# Patient Record
Sex: FEMALE | Race: WHITE | NOT HISPANIC OR LATINO | Employment: OTHER | URBAN - METROPOLITAN AREA
[De-identification: names, ages, dates, MRNs, and addresses within clinical notes are randomized per-mention and may not be internally consistent; named-entity substitution may affect disease eponyms.]

---

## 2020-07-17 ENCOUNTER — TELEPHONE (OUTPATIENT)
Dept: OBSTETRICS AND GYNECOLOGY | Facility: CLINIC | Age: 65
End: 2020-07-17

## 2020-07-17 NOTE — TELEPHONE ENCOUNTER
Attempted to contact patient, no answer. Left patient voice mail to return call to clinic regarding her appt on 7/23/20. Patient can come in earlier that day or reschedule for another day.

## 2020-10-14 ENCOUNTER — TELEPHONE (OUTPATIENT)
Dept: OBSTETRICS AND GYNECOLOGY | Facility: CLINIC | Age: 65
End: 2020-10-14

## 2020-10-14 DIAGNOSIS — B37.2 YEAST DERMATITIS: Primary | ICD-10-CM

## 2020-10-14 RX ORDER — FLUCONAZOLE 200 MG/1
200 TABLET ORAL DAILY
Qty: 3 TABLET | Refills: 0 | Status: SHIPPED | OUTPATIENT
Start: 2020-10-14 | End: 2020-10-17

## 2020-10-14 NOTE — TELEPHONE ENCOUNTER
----- Message from Alecia Krause sent at 10/14/2020  8:18 AM CDT -----  Type:  Needs Medical Advice    Who Called: pt   Symptoms (please be specific): itching    How long has patient had these symptoms:  1week  Pharmacy name and phone #:    Would the patient rather a call back or a response via [facility]? Callback   Best Call Back Number: [phone]   Additional Information

## 2020-10-14 NOTE — TELEPHONE ENCOUNTER
Spoke with patient. Two pt identifiers confirmed.pt requesting a rx for vaginal itching, outside. Used OTC meds that helped but it returned PCN allergy and uses [facility] on [address] fwd to Dr. Burnette for review. Check with pharmacy this afternoon . Patient verbalized understanding.

## 2020-10-22 DIAGNOSIS — B37.2 YEAST DERMATITIS: Primary | ICD-10-CM

## 2020-10-22 RX ORDER — FLUCONAZOLE 200 MG/1
200 TABLET ORAL DAILY
Qty: 3 TABLET | Refills: 0 | Status: SHIPPED | OUTPATIENT
Start: 2020-10-22 | End: 2020-10-25

## 2020-11-09 RX ORDER — NYSTATIN 100000 U/G
CREAM TOPICAL 2 TIMES DAILY
COMMUNITY
Start: 2020-10-17 | End: 2020-11-12

## 2020-11-12 ENCOUNTER — OFFICE VISIT (OUTPATIENT)
Dept: OBSTETRICS AND GYNECOLOGY | Facility: CLINIC | Age: 65
End: 2020-11-12
Payer: MEDICARE

## 2020-11-12 VITALS
WEIGHT: 139.38 LBS | DIASTOLIC BLOOD PRESSURE: 73 MMHG | SYSTOLIC BLOOD PRESSURE: 136 MMHG | BODY MASS INDEX: 24.7 KG/M2 | HEIGHT: 63 IN | HEART RATE: 97 BPM

## 2020-11-12 DIAGNOSIS — N95.2 ATROPHIC VAGINITIS: ICD-10-CM

## 2020-11-12 DIAGNOSIS — Z01.419 ENCOUNTER FOR WELL WOMAN EXAM WITH ROUTINE GYNECOLOGICAL EXAM: Primary | ICD-10-CM

## 2020-11-12 DIAGNOSIS — N95.1 MENOPAUSAL STATE: ICD-10-CM

## 2020-11-12 DIAGNOSIS — Z12.31 BREAST CANCER SCREENING BY MAMMOGRAM: ICD-10-CM

## 2020-11-12 PROBLEM — M81.0 OSTEOPOROSIS: Status: ACTIVE | Noted: 2020-11-12

## 2020-11-12 PROCEDURE — G0101 CA SCREEN;PELVIC/BREAST EXAM: HCPCS | Mod: S$GLB,,, | Performed by: OBSTETRICS & GYNECOLOGY

## 2020-11-12 PROCEDURE — G0101 PR CA SCREEN;PELVIC/BREAST EXAM: ICD-10-PCS | Mod: S$GLB,,, | Performed by: OBSTETRICS & GYNECOLOGY

## 2020-11-12 RX ORDER — ACETAMINOPHEN 500 MG
TABLET ORAL
COMMUNITY

## 2020-11-12 RX ORDER — ALENDRONATE SODIUM 35 MG/1
35 TABLET ORAL
COMMUNITY
End: 2020-11-12 | Stop reason: ALTCHOICE

## 2020-11-12 RX ORDER — DENOSUMAB 60 MG/ML
60 INJECTION SUBCUTANEOUS
COMMUNITY

## 2020-11-12 NOTE — PROGRESS NOTES
CC: Well Woman (menopausal)    HPI:  Patient is a 65 y.o. G0  who presents for her well woman exam today.  History reviewed with patient and changes noted.  Patient without complaints or concerns today.    Past Medical History:   Diagnosis Date    Atrophic vaginitis     Colon polyps     GERD (gastroesophageal reflux disease)     Hypercholesterolemia     Menopausal state     Osteopenia     h/o osteoporsis       History reviewed. No pertinent surgical history.    Social History     Tobacco Use    Smoking status: Never Smoker    Smokeless tobacco: Never Used   Substance Use Topics    Alcohol use: Yes     Alcohol/week: 1.0 standard drinks     Types: 1 Glasses of wine per week     Comment: daily    Drug use: Never       Family History   Problem Relation Age of Onset    Prostate cancer Brother 64    Heart attack Father     Diabetes Brother        Review of patient's allergies indicates:   Allergen Reactions    Pcn [penicillins]          Current Outpatient Medications:     denosumab (PROLIA) 60 mg/mL Syrg, Inject 60 mg into the skin., Disp: , Rfl:     cholecalciferol, vitamin D3, (VITAMIN D3) 50 mcg (2,000 unit) Cap, Take by mouth., Disp: , Rfl:     OBHx: G0     GYN HX:    HX ABNL PAPS:  no abnormals    HX OF STDS:  none    MENOPAUSAL SINCE: 2003    HRT USE: never used    HEALTH MAINTENANCE:  (PCP-Adolfo Carter MD)    LAST ANNUAL/ PAP:   July 18, 2019       LAST PAP:  insufficient due to not enough cells (atrophic changes)    LAST MMG:  about 1 yr ago-neg-[facility]      LAST LABS: just recently with pcp    LAST COLON: 2019 polyps- Q 5 yrs- w/ Dr. Gilmore    LAST DEXA: 2020 osteopenia at ordered by pcp       ROS:  Review of Systems   Constitutional: Negative for activity change, appetite change, chills, fatigue, fever and unexpected weight change.   Respiratory: Negative for cough and shortness of breath.    Cardiovascular: Negative for chest pain and leg swelling.   Gastrointestinal: Negative for  "abdominal pain, bloating, blood in stool, constipation, diarrhea, nausea and vomiting.   Endocrine: Negative for hair loss and hot flashes.   Genitourinary: Negative for decreased libido, dyspareunia, dysuria, flank pain, frequency, hematuria, hot flashes, pelvic pain, urgency, vaginal bleeding, vaginal discharge, urinary incontinence, postmenopausal bleeding, vaginal dryness and vaginal odor.   Musculoskeletal: Negative for arthralgias and joint swelling.   Integumentary:  Negative for rash, acne, mole/lesion, breast mass, nipple discharge, breast skin changes and breast tenderness.   Neurological: Negative for headaches.   Psychiatric/Behavioral: Negative for depression and sleep disturbance. The patient is not nervous/anxious.    Breast: Negative for asymmetry, lump, mass, nipple discharge, skin changes and tenderness      VITALS:  No LMP recorded. Patient is postmenopausal.  Vitals:    11/12/20 1432   BP: 136/73   Pulse: 97   Weight: 63.2 kg (139 lb 6.4 oz)   Height: 5' 3" (1.6 m)     Body mass index is 24.69 kg/m².     PHYSICAL EXAM:  Physical Exam:   Constitutional: She is oriented to person, place, and time. She appears well-developed and well-nourished. She is cooperative. No distress.    HENT:   Head: Normocephalic and atraumatic.     Neck: No thyroid mass present.    Cardiovascular: Normal rate.     Pulmonary/Chest: Effort normal. No respiratory distress. Right breast exhibits no inverted nipple, no mass, no nipple discharge, no skin change, no tenderness and no swelling. Left breast exhibits no inverted nipple, no mass, no nipple discharge, no skin change, no tenderness and no swelling. Breasts are symmetrical.        Abdominal: Soft. Normal appearance. She exhibits no distension. There is no abdominal tenderness.     Genitourinary:    Vagina and uterus normal.      Pelvic exam was performed with patient supine.   There is no rash, tenderness, lesion or injury on the right labia. There is no rash, " tenderness, lesion or injury on the left labia. Uterus is not enlarged, not fixed, not tender and not experiencing uterine prolapse. Cervix is normal. Right adnexum displays no mass, no tenderness and no fullness. Left adnexum displays no mass, no tenderness and no fullness. No erythema, tenderness, bleeding, rectocele, cystocele or unspecified prolapse of vaginal walls in the vagina. Labial bartholins normal.Cervix exhibits no motion tenderness, no discharge and no friability. negative for vaginal discharge          Musculoskeletal: Moves all extremeties. No edema.       Neurological: She is alert and oriented to person, place, and time.    Skin: Skin is warm and dry. No lesion and no rash noted.    Psychiatric: She has a normal mood and affect. Her speech is normal and behavior is normal. Judgment and thought content normal.     *female chaperone present for exam    ASSESSMENT and PLAN:  Encounter for well woman exam with routine gynecological exam  -     Liquid-based pap smear, screening    Breast cancer screening by mammogram  -     Mammo Digital Screening Bilat w/ [facility]att; Future; Expected date: 12/12/2020    Atrophic vaginitis    Menopausal state      FOLLOWUP:  Follow up in about 1 year (around 11/12/2021) for followup visit-breast exam.     COUNSELING:  Patient was counseled today on A.C.S. Pap guidelines and recommendations for yearly pelvic exam, monthly self breast exams, annual mammograms, and screening colonoscopy starting at age 50. Encouraged patient to see her PCP for other health maintenance.

## 2020-11-23 ENCOUNTER — TELEPHONE (OUTPATIENT)
Dept: OBSTETRICS AND GYNECOLOGY | Facility: CLINIC | Age: 65
End: 2020-11-23

## 2020-11-23 NOTE — TELEPHONE ENCOUNTER
Spoke with patient. Two pt identifiers confirmed. Notified patient of results of nl pap. Patient verbalized understanding.

## 2020-11-23 NOTE — TELEPHONE ENCOUNTER
----- Message from Shea Burnette MD sent at 11/23/2020  3:30 PM CST -----  Please let patient know her pap and hpv are both negative

## 2020-12-16 ENCOUNTER — TELEPHONE (OUTPATIENT)
Dept: OBSTETRICS AND GYNECOLOGY | Facility: CLINIC | Age: 65
End: 2020-12-16

## 2020-12-16 NOTE — TELEPHONE ENCOUNTER
----- Message from Celia Gustafson sent at 12/16/2020  1:21 PM CST -----  mr cruz/ [facility] [address] needs bilateral screening mammo w/ genie order faxed today, has appointment tomorrow..please call pt when sent..[phone]

## 2020-12-21 ENCOUNTER — TELEPHONE (OUTPATIENT)
Dept: OBSTETRICS AND GYNECOLOGY | Facility: CLINIC | Age: 65
End: 2020-12-21

## 2020-12-21 NOTE — TELEPHONE ENCOUNTER
Spoke with patient. Two pt identifiers confirmed. Notified patient of results of nl mmg. Declined pt portal. Patient verbalized understanding.

## 2020-12-21 NOTE — TELEPHONE ENCOUNTER
----- Message from Shea Burnette MD sent at 12/20/2020 11:18 PM CST -----  Please let patient know I reviewed her mmg report and it all looks normal   Also encourage her to download the [employer] drew.

## 2020-12-21 NOTE — TELEPHONE ENCOUNTER
----- Message from Celia Gustafson sent at 12/21/2020 11:32 AM CST -----  .Type:  Patient Returning Call    Who Called:self  Who Left Message for Patient: augustin  Does the patient know what this is regarding?:yes  Would the patient rather a call back or a response via [facility]? call  Best Call Back Number:.[phone]    Additional Information:

## 2020-12-21 NOTE — PROGRESS NOTES
Please let patient know I reviewed her mmg report and it all looks normal   Also encourage her to download the [employer] drew.

## 2021-12-08 ENCOUNTER — OFFICE VISIT (OUTPATIENT)
Dept: OBSTETRICS AND GYNECOLOGY | Facility: CLINIC | Age: 66
End: 2021-12-08
Payer: MEDICARE

## 2021-12-08 VITALS
HEIGHT: 63 IN | DIASTOLIC BLOOD PRESSURE: 78 MMHG | HEART RATE: 81 BPM | BODY MASS INDEX: 24.42 KG/M2 | SYSTOLIC BLOOD PRESSURE: 152 MMHG | WEIGHT: 137.81 LBS

## 2021-12-08 DIAGNOSIS — Z01.419 ENCOUNTER FOR WELL WOMAN EXAM WITH ROUTINE GYNECOLOGICAL EXAM: Primary | ICD-10-CM

## 2021-12-08 DIAGNOSIS — Z12.31 BREAST CANCER SCREENING BY MAMMOGRAM: ICD-10-CM

## 2021-12-08 PROCEDURE — G0101 CA SCREEN;PELVIC/BREAST EXAM: HCPCS | Mod: GZ,S$GLB,, | Performed by: OBSTETRICS & GYNECOLOGY

## 2021-12-08 PROCEDURE — G0101 PR CA SCREEN;PELVIC/BREAST EXAM: ICD-10-PCS | Mod: GZ,S$GLB,, | Performed by: OBSTETRICS & GYNECOLOGY

## 2022-01-03 ENCOUNTER — TELEPHONE (OUTPATIENT)
Dept: OBSTETRICS AND GYNECOLOGY | Facility: CLINIC | Age: 67
End: 2022-01-03
Payer: MEDICARE

## 2022-01-03 NOTE — TELEPHONE ENCOUNTER
Spoke with patient. Two pt identifiers confirmed. Notified patient of normal mammogram results. Patient verbalized understanding.

## 2022-01-03 NOTE — TELEPHONE ENCOUNTER
----- Message from Shea Burnette MD sent at 1/1/2022  5:42 PM CST -----  Please let patient know I reviewed her mmg report and it is normal. I recommend her getting her next screening mmg in 1 year    Also encourage her to download the [employer] drew.

## 2022-11-30 ENCOUNTER — TELEPHONE (OUTPATIENT)
Dept: OBSTETRICS AND GYNECOLOGY | Facility: CLINIC | Age: 67
End: 2022-11-30
Payer: MEDICARE

## 2022-11-30 NOTE — TELEPHONE ENCOUNTER
----- Message from Claudia Cheng sent at 11/29/2022  4:58 PM CST -----  Contact: Patient  Type:  Sooner Appointment Request    Caller is requesting a sooner appointment.  Caller declined first available appointment listed below.  Caller will not accept being placed on the waitlist and is requesting a message be sent to doctor.  Name of Caller:Светлана Mary  When is the first available appointment?01/27/2023  Symptoms:annual  Would the patient rather a call back or a response via [facility]? Call back   Best Call Back Number:[phone]  Additional Information:

## 2023-05-10 ENCOUNTER — OFFICE VISIT (OUTPATIENT)
Dept: OBSTETRICS AND GYNECOLOGY | Facility: CLINIC | Age: 68
End: 2023-05-10
Payer: MEDICARE

## 2023-05-10 VITALS
HEIGHT: 63 IN | WEIGHT: 149 LBS | SYSTOLIC BLOOD PRESSURE: 144 MMHG | DIASTOLIC BLOOD PRESSURE: 83 MMHG | BODY MASS INDEX: 26.4 KG/M2

## 2023-05-10 DIAGNOSIS — R89.8 EOSINOPHIL COUNT RAISED: ICD-10-CM

## 2023-05-10 DIAGNOSIS — Z12.31 BREAST CANCER SCREENING BY MAMMOGRAM: Primary | ICD-10-CM

## 2023-05-10 DIAGNOSIS — N95.1 MENOPAUSAL STATE: ICD-10-CM

## 2023-05-10 DIAGNOSIS — Z78.0 ENCOUNTER FOR OSTEOPOROSIS SCREENING IN ASYMPTOMATIC POSTMENOPAUSAL PATIENT: ICD-10-CM

## 2023-05-10 DIAGNOSIS — Z13.820 ENCOUNTER FOR OSTEOPOROSIS SCREENING IN ASYMPTOMATIC POSTMENOPAUSAL PATIENT: ICD-10-CM

## 2023-05-10 PROBLEM — E79.0 HYPERURICEMIA WITHOUT SIGNS OF INFLAMMATORY ARTHRITIS AND TOPHACEOUS DISEASE: Status: ACTIVE | Noted: 2023-05-02

## 2023-05-10 PROCEDURE — G0101 PR CA SCREEN;PELVIC/BREAST EXAM: ICD-10-PCS | Mod: GZ,S$GLB,, | Performed by: OBSTETRICS & GYNECOLOGY

## 2023-05-10 PROCEDURE — G0101 CA SCREEN;PELVIC/BREAST EXAM: HCPCS | Mod: GZ,S$GLB,, | Performed by: OBSTETRICS & GYNECOLOGY

## 2023-05-10 RX ORDER — DENOSUMAB 60 MG/ML
60 INJECTION SUBCUTANEOUS
COMMUNITY

## 2023-05-10 NOTE — PROGRESS NOTES
FOLLOW UP VISIT - menopausal since   Patient Care Team:  Primary Doctor No as PCP - General  Yan Gilmore MD as Consulting Physician (Gastroenterology)  Adilene Miguel MD as Current PCP (Internal Medicine)    CC:  follow up menopause, atrophic vaginitis, breast check  HPI:  Pt is a 67 y.o.  who is here for breast check and to update on recent history. Pt went to [facility] recently to work up her persistently elevated eosinophil count and did a bone marrow bx 2 weeks ago. Pt getting results tomorrow and if ok will further pursue with a dermatologist. Also had cardiology workup for palpitations and all normal but feeling some recently and will call dr diaz    The patient's last visit with me was on 2021 for wwe    REVIEW OF PRIOR DATA/ HEALTH MAINTENANCE:  LAST ANNUAL:   2021   LAST LABS- up to date with PCP    LAST MMG (screening)- 2022- normal at [facility]     LAST COLONOSCOPY- - by Dr. Gilmore  -q 5 yrs for polyps   LAST DEXA- - osteopenia at [facility]     HX ABNL PAPS: none    HRT:  never used    Past Medical History:   Diagnosis Date    Atrophic vaginitis     Colon polyps     GERD (gastroesophageal reflux disease)     History of bone marrow biopsy 2023    Hypercholesterolemia     Menopausal state     Osteopenia     h/o osteoporsis     SURGICAL HX:   has no past surgical history on file.  Family, obstetric, and social history reviewed and updated    Current Outpatient Medications   Medication Instructions    cholecalciferol, vitamin D3, (VITAMIN D3) 50 mcg (2,000 unit) Cap Oral    PROLIA 60 mg, Subcutaneous    PROLIA 60 mg, Subcutaneous     ALLERGIES: Pcn [penicillins]    ROS:  CONST:  No fever, chills, fatigue or unexpected changes in weight   CV: No chest pain or palpitations   RESP:  No shortness of breath or cough   GI: No abd pain, vomiting, diarrhea, blood in stool, or changes in bowel mvmts   SKIN: No rashes or lesions  MUSCULOSKELETAL: No joint swelling or  "pain   PSYCH: No changes in mood or insomia   BREASTS: No asymmetry, lumps, pain, nipple discharge, or skin changes   :  No dysuria, urgency, frequency, hematuria or incontinence           No vag dc, itching, odor or dryness           No pelvic pain, dyspareunia, or abnormal vaginal bleeding     VITALS:  Blood pressure (!) 144/83, height 5' 3" (1.6 m), weight 67.6 kg (149 lb).  Body mass index is 26.39 kg/m².    PHYSICAL EXAM-  APPEARANCE: Well appearing, in no acute distress.   NECK: Neck symmetric   CV:  Normal rate   PULM: Normal resp rate, no resp distress, normal resp effort   PSYCH:  Normal mood and affect, cooperative   SKIN: No rashes, lesions, or abnormal bruising   LYMPH: No inguinal or axillary adenopathy   ABD: Soft, without tenderness or masses.   BREAST: Symmetrical, no nipple changes, no skin changes, No palpable masses   PELVIC:  VULVA: Normal female genitalia. No lesions.   URETHRAL MEATUS: No masses, no significant prolapse.   BLADDER/ URETHRA: No masses or suprapubic tenderness   VAGINA: No lesions. +atrophic changes. No discharge   CVX: No lesions   UTERUS: Normal size/shape, mobile, non-tender   ADNEXA: No masses, tenderness, or fullness   ANUS/ PERINEUM: Normal tone.  No lesions.     *female chaperone present for entire exam     ASSESSMENT/ PLAN:  Breast cancer screening by mammogram  -     Mammo Digital Screening Bilat w/ [facility]att; Future; Expected date: 05/24/2023    Menopausal state    Encounter for osteoporosis screening in asymptomatic postmenopausal patient  -     DXA Bone Density Axial Skeleton 1 or more sites; Future; Expected date: 05/24/2023    Eosinophil count raised    FOLLOWUP:  WWE or sooner prn      "

## 2023-12-29 ENCOUNTER — TELEPHONE (OUTPATIENT)
Dept: OBSTETRICS AND GYNECOLOGY | Facility: CLINIC | Age: 68
End: 2023-12-29
Payer: MEDICARE

## 2023-12-29 NOTE — TELEPHONE ENCOUNTER
----- Message from Margarita Lopez sent at 12/29/2023  9:52 AM CST -----  Type:  Needs Medical Advice    Who Called: Doreen barrow/ Pratibha   Symptoms (please be specific): -   How long has patient had these symptoms:  -  Pharmacy name and phone #:  -  Would the patient rather a call back or a response via [facility]? CB   Best Call Back Number: [phone]  Additional Information:  needs bone density and mammo orders faxed to [phone] ( pt is there now for appt )

## 2024-01-02 ENCOUNTER — TELEPHONE (OUTPATIENT)
Dept: OBSTETRICS AND GYNECOLOGY | Facility: CLINIC | Age: 69
End: 2024-01-02
Payer: MEDICARE

## 2024-01-02 NOTE — TELEPHONE ENCOUNTER
----- Message from Shea Burnette MD sent at 1/1/2024  8:55 PM CST -----  Please let patient know I reviewed her mmg report and it is normal. I recommend her getting her next screening mmg in 1 year    Also encourage her to download the [employer] drew if she is not already on it

## 2024-01-02 NOTE — PROGRESS NOTES
Please let patient know I reviewed her mmg report and it is normal. I recommend her getting her next screening mmg in 1 year    Also encourage her to download the [employer] drew if she is not already on it

## 2024-01-03 ENCOUNTER — TELEPHONE (OUTPATIENT)
Dept: OBSTETRICS AND GYNECOLOGY | Facility: CLINIC | Age: 69
End: 2024-01-03
Payer: MEDICARE

## 2024-01-03 NOTE — TELEPHONE ENCOUNTER
----- Message from Shea Burnette MD sent at 1/3/2024  8:41 AM CST -----  Please let pt know her dexa shows osteopenia. She is already on medication but can repeat in 2 yrs

## 2024-01-03 NOTE — TELEPHONE ENCOUNTER
Spoke with patient. Two pt identifiers confirmed. Notified patient of her dexa results. Patient verbalized understanding.

## 2024-05-22 NOTE — PROGRESS NOTES
CC: FOLLOWUP (menopausal since   )  Patient Care Team:  Adilene Miguel MD as PCP - General (Internal Medicine)  Pato Devlin Jr., MD (Gastroenterology)    Last visit with me was on  5/10/2023 for followup    HPI:  Patient is a 68 y.o. who presents for her breast check today.  History reviewed with patient. Since last visit, patient had a skin lesion removed that was not cancerous but derm md called her and said the kind of lesion it was suggested she may have a gene mutation that could also increase her risk for colon and possibly ovarian cancer and offered genetic testing. Pt declined the genetic testing as she has no children , but will do the screenings- did colonoscopy and can go 5 yrs but will do cologard with her pcp q yr between. Doing pelvic exam q yr here as well and discussed yearly ultrasound as well although there is no specific recommendations for screening for ovarian cancer.   Patient is without complaints or concerns today.     HRT:  never used systemic hrt  HX ABNL PAPS: none    REVIEW OF PRIOR DATA/ HEALTH MAINTENANCE:  LAST ANNUAL:   2021    LAST MMG (screening)- 2023-  [facility]     LAST COLONOSCOPY- - by Dr. Gilmore  -q 5 yrs for polyps   LAST DEXA- - osteopenia at [facility]      Past Medical History:   Diagnosis Date    Atrophic vaginitis     Colon polyps     GERD (gastroesophageal reflux disease)     History of bone marrow biopsy 2023    Hypercholesterolemia     Menopausal state     Osteopenia     h/o osteoporsis     SURGICAL HX:   has no past surgical history on file.    SOCIAL HX:    reports that she has never smoked. She has never used smokeless tobacco. She reports current alcohol use of about 1.0 standard drink of alcohol per week. She reports that she does not use drugs.    Current Outpatient Medications   Medication Instructions    cholecalciferol, vitamin D3, (VITAMIN D3) 50 mcg (2,000 unit) Cap Oral    PROLIA 60 mg, Subcutaneous     VITALS:  Blood  "pressure (!) 148/80, height 5' 3" (1.6 m), weight 68 kg (150 lb).  Body mass index is 26.57 kg/m².     PHYSICAL EXAM-  APPEARANCE: Well appearing, in no acute distress.   CV/PULM: No resp distress, normal resp effort   PSYCH:  Normal mood and affect, cooperative   SKIN: No rashes, lesions, or abnormal bruising   ABD: Soft, without tenderness or masses.   BREAST: deferred/declined  PELVIC:  VULVA: Normal female genitalia. No lesions.   URETHRAL MEATUS: No masses, no significant prolapse.   BLADDER/ URETHRA: No masses or suprapubic tenderness   VAGINA/ CVX: No lesions. +atrophic changes. No discharge   UTERUS:  mobile, non-tender   ADNEXA: No masses, tenderness, or fullness   ANUS/ PERINEUM: Normal tone.  No lesions.           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  Discussed vitamin D and calcium supplements as well as weight bearing exercise to decrease risks. Encouraged patient to see her PCP for other health maintenance.       "

## 2024-05-23 ENCOUNTER — OFFICE VISIT (OUTPATIENT)
Dept: OBSTETRICS AND GYNECOLOGY | Facility: CLINIC | Age: 69
End: 2024-05-23
Payer: MEDICARE

## 2024-05-23 VITALS
DIASTOLIC BLOOD PRESSURE: 80 MMHG | HEIGHT: 63 IN | SYSTOLIC BLOOD PRESSURE: 148 MMHG | BODY MASS INDEX: 26.58 KG/M2 | WEIGHT: 150 LBS

## 2024-05-23 DIAGNOSIS — Z91.89 HIGH RISK OF OVARIAN CANCER: ICD-10-CM

## 2024-05-23 DIAGNOSIS — Z14.8 CARRIER OF GENE MUTATION FOR HIGH RISK OF CANCER: ICD-10-CM

## 2024-05-23 DIAGNOSIS — Z01.419 ENCOUNTER FOR WELL WOMAN EXAM WITH ROUTINE GYNECOLOGICAL EXAM: ICD-10-CM

## 2024-05-23 DIAGNOSIS — Z12.31 BREAST CANCER SCREENING BY MAMMOGRAM: Primary | ICD-10-CM

## 2024-05-23 PROCEDURE — 99214 OFFICE O/P EST MOD 30 MIN: CPT | Mod: S$GLB,,, | Performed by: OBSTETRICS & GYNECOLOGY

## 2024-05-23 PROCEDURE — 99459 PELVIC EXAMINATION: CPT | Mod: S$GLB,,, | Performed by: OBSTETRICS & GYNECOLOGY

## 2024-05-28 ENCOUNTER — PROCEDURE VISIT (OUTPATIENT)
Dept: OBSTETRICS AND GYNECOLOGY | Facility: CLINIC | Age: 69
End: 2024-05-28
Payer: MEDICARE

## 2024-05-28 DIAGNOSIS — Z91.89 HIGH RISK OF OVARIAN CANCER: ICD-10-CM

## 2024-05-28 PROCEDURE — 76830 TRANSVAGINAL US NON-OB: CPT | Mod: S$GLB,,, | Performed by: OBSTETRICS & GYNECOLOGY

## 2024-05-29 DIAGNOSIS — N88.2 CERVICAL OS STENOSIS: ICD-10-CM

## 2024-05-29 DIAGNOSIS — N88.2 CERVICAL OS STENOSIS: Primary | ICD-10-CM

## 2024-05-29 RX ORDER — MISOPROSTOL 200 UG/1
TABLET ORAL
Qty: 2 TABLET | Refills: 0 | Status: SHIPPED | OUTPATIENT
Start: 2024-05-29 | End: 2024-06-19

## 2024-05-29 RX ORDER — MISOPROSTOL 200 UG/1
TABLET ORAL
Qty: 2 TABLET | Refills: 0 | Status: SHIPPED | OUTPATIENT
Start: 2024-05-29 | End: 2024-05-29

## 2024-05-29 NOTE — PROGRESS NOTES
Please let pt know that neither ovary could be seen which is normally a good thing in that if they are abnormal they are usually very easy to see.  However the lining of her uterus was a little thicker than it should be in menopause. It is 7.4mm and we like it to be 4mm or less. It may be a little old blood or a polyp but the next step is to try and do an endometrial biopsy. I am going to send out cytotec for her to take the night before and see if it will help soften and open up her cervix a little.

## 2024-06-19 ENCOUNTER — PROCEDURE VISIT (OUTPATIENT)
Dept: OBSTETRICS AND GYNECOLOGY | Facility: CLINIC | Age: 69
End: 2024-06-19
Payer: MEDICARE

## 2024-06-19 VITALS
WEIGHT: 150 LBS | DIASTOLIC BLOOD PRESSURE: 83 MMHG | HEIGHT: 63 IN | BODY MASS INDEX: 26.58 KG/M2 | SYSTOLIC BLOOD PRESSURE: 170 MMHG

## 2024-06-19 DIAGNOSIS — N95.1 MENOPAUSAL STATE: ICD-10-CM

## 2024-06-19 DIAGNOSIS — R93.89 THICKENED ENDOMETRIUM: Primary | ICD-10-CM

## 2024-06-19 PROCEDURE — 99214 OFFICE O/P EST MOD 30 MIN: CPT | Mod: S$GLB,,, | Performed by: OBSTETRICS & GYNECOLOGY

## 2024-06-19 RX ORDER — PROGESTERONE 100 MG/1
100 CAPSULE ORAL NIGHTLY
Qty: 30 CAPSULE | Refills: 2 | Status: SHIPPED | OUTPATIENT
Start: 2024-06-19 | End: 2025-06-19

## 2024-06-19 NOTE — PROCEDURES
PROBLEM VISIT - menopausal since   Patient Care Team:  Adilene Miguel MD as PCP - General (Internal Medicine)  Pato Devlin Jr., MD (Gastroenterology)    CC:  discuss ultrasound results  HPI:  Pt is a 68 y.o.  who was originally was scheduled for emb but would like to discuss further. PT was told she has a gene mutation by derm that could increase her risk of ovarian cancer. Has not had any symptoms or problems since menopause in . Discussed no go screening for ovarian cancer and did ultrasound and was unable to visualize either ovary but did found an ems of 7mm which is thickened for being menopausal. Pt takes no meds except her vitamin d and her prolia. Recommended option of EMB but pt is a G0 and would like to consider repeating ultrasound prior to emb or d and c. Discussed results do not show any significant findings suggestive of malignancy but cannot say without a sample. Pt questions answered and will take progesterone qhs for 6-8 weeks and repeat ultrasound. If not thinned to 4mm or if is any thicker will do emb vs d&c. Pt also will let us know if any bleeding    The patient's last visit with me was on 2024 for wwe    REVIEW OF PRIOR DATA/ HEALTH MAINTENANCE:  LAST ANNUAL:   May 23 2024    LAST MMG (screening)- 2023-  [facility]     LAST COLONOSCOPY- - by Dr. 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 Pt decided she did not want to do the genetic testing as she has no children and will just do the screenings (colonoscopy q 5 with cologard q yr, will do exam and u/s q yr,  and then derm f/u)    Colon polyps     GERD (gastroesophageal  "reflux disease)     History of bone marrow biopsy 04/2023    Hypercholesterolemia     Menopausal state     Osteopenia     h/o osteoporsis     SURGICAL HX:   has no past surgical history on file.    Current Outpatient Medications   Medication Instructions    cholecalciferol, vitamin D3, (VITAMIN D3) 50 mcg (2,000 unit) Cap Oral    progesterone (PROMETRIUM) 100 mg, Oral, Nightly    PROLIA 60 mg, Subcutaneous     VITALS:  Blood pressure (!) 170/83, height 5' 3" (1.6 m), weight 68 kg (150 lb).  Body mass index is 26.57 kg/m².    PHYSICAL EXAM-  APPEARANCE: Well appearing, in no acute distress.  CV/ PULM: no resp distress, normal resp effort  PSYCH: Normal mood and affect, cooperative  SKIN: No rashes, lesions, or abnormal bruising  ABD: Soft, no masses, tenderness, or distension  BREASTS: declined/ deferred  PELVIC: declined/ deferred    ASSESSMENT/ PLAN:  Thickened endometrium  -     progesterone (PROMETRIUM) 100 MG capsule; Take 1 capsule (100 mg total) by mouth nightly.  Dispense: 30 capsule; Refill: 2    Menopausal state      FOLLOWUP:  WWE or sooner prn       "

## 2024-07-18 ENCOUNTER — TELEPHONE (OUTPATIENT)
Dept: OBSTETRICS AND GYNECOLOGY | Facility: CLINIC | Age: 69
End: 2024-07-18
Payer: MEDICARE

## 2024-08-16 ENCOUNTER — PROCEDURE VISIT (OUTPATIENT)
Dept: OBSTETRICS AND GYNECOLOGY | Facility: CLINIC | Age: 69
End: 2024-08-16
Payer: MEDICARE

## 2024-08-16 DIAGNOSIS — N95.0 PMB (POSTMENOPAUSAL BLEEDING): ICD-10-CM

## 2024-08-16 DIAGNOSIS — N95.0 PMB (POSTMENOPAUSAL BLEEDING): Primary | ICD-10-CM

## 2024-08-20 ENCOUNTER — TELEPHONE (OUTPATIENT)
Dept: OBSTETRICS AND GYNECOLOGY | Facility: CLINIC | Age: 69
End: 2024-08-20
Payer: MEDICARE

## 2024-08-20 NOTE — TELEPHONE ENCOUNTER
Spoke with patient. Two pt identifiers confirmed. Notified patient of her ultrasound results. She wants to repeat the ultrasound in 6 weeks to see if there are any changes prior to having the procedure done. Patient will continue the Progesterone as well. Patient verbalized understanding.

## 2024-08-20 NOTE — PROGRESS NOTES
Yes she had a skin bx and then needed to look at her ovaries and did ultrasound that showed her endometrium was thickened. We discussed it and really wanted to wait a few weeks and try progesterone but this is still the same and is still thickened.  It is not any thicker, but is still 7mm when should be less than 4mm and still is not normal. I can't say definitively if there is something bad in there or not without looking. Ideally the sooner the better but if she wants to wait till I can do it, the I would say she needs to look in another 4-6 weeks and make sure not getting any thicker

## 2024-08-20 NOTE — TELEPHONE ENCOUNTER
----- Message from Shea Burnette MD sent at 8/20/2024 10:52 AM CDT -----  Yes she had a skin bx and then needed to look at her ovaries and did ultrasound that showed her endometrium was thickened. We discussed it and really wanted to wait a few weeks and try progesterone but this is still the same and is still thickened.  It is not any thicker, but is still 7mm when should be less than 4mm and still is not normal. I can't say definitively if there is something bad in there or not without looking. Ideally the sooner the better but if she wants to wait till I can do it, the I would say she needs to look in another 4-6 weeks and make sure not getting any thicker

## 2024-08-20 NOTE — PROGRESS NOTES
Please let pt know her ultrasound is still showing that uterine lining is thickened.  So I would recommend she do a hysteroscopy/d&C.  However, Im not going to be able to operate till mid-end October so maybe we can see if she is interested in doing it with trish or brown

## 2024-09-13 ENCOUNTER — TELEPHONE (OUTPATIENT)
Dept: OBSTETRICS AND GYNECOLOGY | Facility: CLINIC | Age: 69
End: 2024-09-13
Payer: MEDICARE

## 2024-09-13 DIAGNOSIS — R93.89 THICKENED ENDOMETRIUM: ICD-10-CM

## 2024-09-13 NOTE — TELEPHONE ENCOUNTER
----- Message from Alla Missylopez sent at 9/13/2024 11:58 AM CDT -----  Contact: Светлана  Patient is calling to speak with someone regarding prescription. Patient reports having 5 capsules remaining of progesterone (PROMETRIUM) 100 MG capsule prescription and request to confirm if needing to continue prescription. Patient request a refill is sent to pharmacy, if necessary. Please give patient a call back at [phone] or [phone] to assist.    Thank you,  [facility]

## 2024-09-16 RX ORDER — PROGESTERONE 100 MG/1
CAPSULE ORAL
Qty: 90 CAPSULE | Refills: 0 | Status: SHIPPED | OUTPATIENT
Start: 2024-09-16

## 2024-09-27 ENCOUNTER — PROCEDURE VISIT (OUTPATIENT)
Dept: OBSTETRICS AND GYNECOLOGY | Facility: CLINIC | Age: 69
End: 2024-09-27
Payer: MEDICARE

## 2024-09-27 ENCOUNTER — TELEPHONE (OUTPATIENT)
Dept: OBSTETRICS AND GYNECOLOGY | Facility: CLINIC | Age: 69
End: 2024-09-27
Payer: MEDICARE

## 2024-09-27 DIAGNOSIS — R93.89 THICKENED ENDOMETRIUM: ICD-10-CM

## 2024-09-27 DIAGNOSIS — R93.89 THICKENED ENDOMETRIUM: Primary | ICD-10-CM

## 2024-09-27 PROCEDURE — 76830 TRANSVAGINAL US NON-OB: CPT | Mod: 26,S$PBB,, | Performed by: OBSTETRICS & GYNECOLOGY

## 2024-09-27 NOTE — TELEPHONE ENCOUNTER
----- Message from Anna Lagunas sent at 9/27/2024 12:20 PM CDT -----  Contact: self  Type:  Patient Returning Call    Who Called:Светлана Hernandez  Who Left Message for Patient:unsure  Does the patient know what this is regarding?:results  Would the patient rather a call back or a response via [facility]?   Best Call Back Number:[phone]  Additional Information: n/a

## 2024-09-27 NOTE — PROGRESS NOTES
Please let pt know that her endometrium is measuring exactly the same at 7.2mm on this followup ultrasound. She has been on progesterone as she was not ready to proceed to a d and c but it has not thinned it out so is most likely a polyp. However, the recommendation is still to do a hysteroscopy/ D&C to evaluate and remove it. Her options at this point would be to see about getting dr chambers to do it in near future, send her to gyn onc, or if she wants me to do it I can hopefully do it end of October/early nov. I would of course recommend doing it sooner rather than later just to be on the safe side as I can't tell her exactly what it is until it is out. Dr Rosenthal is also an option but I believe she wants a female but we can verify that with her if she wants to do with one of my partners

## 2024-09-27 NOTE — TELEPHONE ENCOUNTER
Spoke with patient. Two pt identifiers confirmed. Notified patient of results. Patient verbalized understanding. Patient would like to wait on Dr. Burnette, but if Dr. Burnette thinks it is an emergency and has to be done now, she would like to know the name of the doctor that could possibly do the procedure before scheduling it. Will talk to Teri and call patient back next week.

## 2024-09-27 NOTE — TELEPHONE ENCOUNTER
Spoke to patient and notified that Dr Burnette and her staff are of the office. I will send her staff a message to contact her Monday.     She had questions about the progesterone she was started on.

## 2024-09-27 NOTE — TELEPHONE ENCOUNTER
----- Message from Shea Burnette MD sent at 9/27/2024 11:31 AM CDT -----  Please let pt know that her endometrium is measuring exactly the same at 7.2mm on this followup ultrasound. She has been on progesterone as she was not ready to proceed to a d and c but it has not thinned it out so is most likely a polyp. However, the recommendation is still to do a hysteroscopy/ D&C to evaluate and remove it. Her options at this point would be to see about getting dr chambers to do it in near future, send her to gyn onc, or if she wants me to do it I can hopefully do it end of October/early nov. I would of course recommend doing it sooner rather than later just to be on the safe side as I can't tell her exactly what it is until it is out. Dr Rosenthal is also an option but I believe she wants a female but we can verify that with her if she wants to do with one of my partners

## 2024-09-30 ENCOUNTER — TELEPHONE (OUTPATIENT)
Dept: OBSTETRICS AND GYNECOLOGY | Facility: CLINIC | Age: 69
End: 2024-09-30
Payer: MEDICARE

## 2024-09-30 NOTE — TELEPHONE ENCOUNTER
----- Message from Margarita sent at 9/30/2024 10:55 AM CDT -----  Type:  Patient Returning Call    Who Called:Светлана Hernandez    Who Left Message for Patient: ofc/nurse   Does the patient know what this is regarding?:-  Would the patient rather a call back or a response via [facility]?    Best Call Back Number:[phone]    Additional Information:  RTC missed call   Recommend LID HYGIENE with warm compress and/or eyelid wash at least once a day.

## 2024-09-30 NOTE — TELEPHONE ENCOUNTER
Attempted to contact patient, no answer. Left patient voice mail to return call to clinic regarding her pre-op appointment with Dr. Burnette. I left the appointment details on the voice mail.

## 2024-10-02 DIAGNOSIS — N95.0 PMB (POSTMENOPAUSAL BLEEDING): Primary | ICD-10-CM

## 2024-10-02 DIAGNOSIS — R93.89 THICKENED ENDOMETRIUM: ICD-10-CM

## 2024-10-28 ENCOUNTER — OFFICE VISIT (OUTPATIENT)
Dept: OBSTETRICS AND GYNECOLOGY | Facility: CLINIC | Age: 69
End: 2024-10-28
Payer: MEDICARE

## 2024-10-28 VITALS
BODY MASS INDEX: 25.72 KG/M2 | DIASTOLIC BLOOD PRESSURE: 80 MMHG | HEART RATE: 96 BPM | WEIGHT: 145.19 LBS | SYSTOLIC BLOOD PRESSURE: 144 MMHG

## 2024-10-28 DIAGNOSIS — Z01.818 PREOPERATIVE EXAM FOR GYNECOLOGIC SURGERY: ICD-10-CM

## 2024-10-28 DIAGNOSIS — R93.89 THICKENED ENDOMETRIUM: Primary | ICD-10-CM

## 2024-10-28 LAB
ANION GAP SERPL CALC-SCNC: 6 MMOL/L (ref 3–11)
APPEARANCE, UA: CLEAR
BASOPHILS NFR BLD: 0.6 % (ref 0–3)
BILIRUB UR QL STRIP: NEGATIVE MG/DL
BUN SERPL-MCNC: 12 MG/DL (ref 7–18)
BUN/CREAT SERPL: 14.81 RATIO (ref 7–18)
CALCIUM SERPL-MCNC: 9 MG/DL (ref 8.8–10.5)
CHLORIDE SERPL-SCNC: 103 MMOL/L (ref 100–108)
CO2 SERPL-SCNC: 30 MMOL/L (ref 21–32)
COLOR UR: ABNORMAL
CREAT SERPL-MCNC: 0.81 MG/DL (ref 0.55–1.02)
EOSINOPHIL NFR BLD: 2.6 % (ref 1–3)
ERYTHROCYTE [DISTWIDTH] IN BLOOD BY AUTOMATED COUNT: 12.7 % (ref 12.5–18)
GFR ESTIMATION: 78
GLUCOSE (UA): NORMAL MG/DL
GLUCOSE SERPL-MCNC: 104 MG/DL (ref 70–110)
HCT VFR BLD AUTO: 44.1 % (ref 37–47)
HGB BLD-MCNC: 14.6 G/DL (ref 12–16)
HGB UR QL STRIP: NEGATIVE /UL
KETONES UR QL STRIP: ABNORMAL MG/DL
LEUKOCYTE ESTERASE UR QL STRIP: NEGATIVE /UL
LYMPHOCYTES NFR BLD: 24.3 % (ref 25–40)
MCH RBC QN AUTO: 29.9 PG (ref 27–31.2)
MCHC RBC AUTO-ENTMCNC: 33.1 G/DL (ref 31.8–35.4)
MCV RBC AUTO: 90.4 FL (ref 80–97)
MONOCYTES NFR BLD: 5.8 % (ref 1–15)
NEUTROPHILS # BLD AUTO: 5.9 10*3/UL (ref 1.8–7.7)
NEUTROPHILS NFR BLD: 66.3 % (ref 37–80)
NITRITE UR QL STRIP: NEGATIVE
NUCLEATED RED BLOOD CELLS: 0 %
PH UR STRIP: 7 PH (ref 5–8)
PLATELETS: 306 10*3/UL (ref 142–424)
POTASSIUM SERPL-SCNC: 3.8 MMOL/L (ref 3.6–5.2)
PROT UR QL STRIP: NEGATIVE MG/DL
RBC # BLD AUTO: 4.88 10*6/UL (ref 4.2–5.4)
SODIUM BLD-SCNC: 139 MMOL/L (ref 135–145)
SP GR UR STRIP: 1.01 (ref 1–1.03)
SPECIMEN COLLECTION METHOD, URINE: ABNORMAL
UROBILINOGEN UR STRIP-ACNC: NORMAL MG/DL
WBC # BLD: 8.9 10*3/UL (ref 4.6–10.2)

## 2024-10-28 PROCEDURE — 99214 OFFICE O/P EST MOD 30 MIN: CPT | Mod: S$PBB,,, | Performed by: OBSTETRICS & GYNECOLOGY

## 2024-11-04 ENCOUNTER — TELEPHONE (OUTPATIENT)
Dept: OBSTETRICS AND GYNECOLOGY | Facility: CLINIC | Age: 69
End: 2024-11-04

## 2024-11-04 ENCOUNTER — OUTSIDE PLACE OF SERVICE (OUTPATIENT)
Dept: OBSTETRICS AND GYNECOLOGY | Facility: CLINIC | Age: 69
End: 2024-11-04

## 2024-11-04 NOTE — TELEPHONE ENCOUNTER
Returned colt to patient. She reports burning and stinging with urination. Informed patient that it could be caused by her having a catheter or a small tear at the bottom opening of the vagina. Advised patient to try Dermaplast spray on her bottom for some relief. Patient verbalized understanding.

## 2024-11-04 NOTE — TELEPHONE ENCOUNTER
----- Message from Margarita sent at 11/4/2024  3:16 PM CST -----  Type:  Needs Medical Advice    Who Called: Светлана Hernandez    Symptoms (please be specific):  stinging/ burning sensation when urine's ( had D&C today  )  How long has patient had these symptoms:  today   Pharmacy name and phone #:  -  Would the patient rather a call back or a response via [facility]?    Best Call Back Number: [phone]    Additional Information: would like to speak w/ nurse please call

## 2024-11-06 ENCOUNTER — TELEPHONE (OUTPATIENT)
Dept: OBSTETRICS AND GYNECOLOGY | Facility: CLINIC | Age: 69
End: 2024-11-06
Payer: MEDICARE

## 2024-11-06 NOTE — TELEPHONE ENCOUNTER
Please let pt know the pathology came back and the polyp we removed Monday during her d and c was all benign so no worries or future treatment needed. How is she feeling

## 2025-06-04 PROBLEM — E78.2 MIXED HYPERLIPIDEMIA: Status: ACTIVE | Noted: 2025-02-06

## 2025-06-04 PROBLEM — R73.01 IFG (IMPAIRED FASTING GLUCOSE): Status: ACTIVE | Noted: 2025-02-06

## 2025-06-04 PROBLEM — E55.9 VITAMIN D DEFICIENCY: Status: ACTIVE | Noted: 2025-02-06

## 2025-06-05 ENCOUNTER — OFFICE VISIT (OUTPATIENT)
Dept: OBSTETRICS AND GYNECOLOGY | Facility: CLINIC | Age: 70
End: 2025-06-05
Payer: MEDICARE

## 2025-06-05 VITALS
HEIGHT: 63 IN | WEIGHT: 149 LBS | BODY MASS INDEX: 26.4 KG/M2 | SYSTOLIC BLOOD PRESSURE: 147 MMHG | DIASTOLIC BLOOD PRESSURE: 83 MMHG

## 2025-06-05 DIAGNOSIS — B37.2 YEAST DERMATITIS: ICD-10-CM

## 2025-06-05 DIAGNOSIS — N39.3 SUI (STRESS URINARY INCONTINENCE, FEMALE): ICD-10-CM

## 2025-06-05 DIAGNOSIS — Z12.31 BREAST CANCER SCREENING BY MAMMOGRAM: Primary | ICD-10-CM

## 2025-06-05 DIAGNOSIS — Z01.419 ENCOUNTER FOR WELL WOMAN EXAM WITH ROUTINE GYNECOLOGICAL EXAM: ICD-10-CM

## 2025-06-05 DIAGNOSIS — Z14.8 CARRIER OF GENE MUTATION FOR HIGH RISK OF CANCER: ICD-10-CM

## 2025-06-05 RX ORDER — NYSTATIN AND TRIAMCINOLONE ACETONIDE 100000; 1 [USP'U]/G; MG/G
CREAM TOPICAL
Qty: 30 G | Refills: 3 | Status: SHIPPED | OUTPATIENT
Start: 2025-06-05 | End: 2026-06-05